# Patient Record
Sex: FEMALE | Race: WHITE
[De-identification: names, ages, dates, MRNs, and addresses within clinical notes are randomized per-mention and may not be internally consistent; named-entity substitution may affect disease eponyms.]

---

## 2018-02-14 ENCOUNTER — HOSPITAL ENCOUNTER (EMERGENCY)
Dept: HOSPITAL 17 - NEPK | Age: 23
Discharge: HOME | End: 2018-02-14
Payer: SELF-PAY

## 2018-02-14 VITALS
RESPIRATION RATE: 16 BRPM | HEART RATE: 87 BPM | SYSTOLIC BLOOD PRESSURE: 146 MMHG | TEMPERATURE: 98.2 F | DIASTOLIC BLOOD PRESSURE: 71 MMHG | OXYGEN SATURATION: 97 %

## 2018-02-14 VITALS — HEIGHT: 66 IN | WEIGHT: 220.46 LBS | BODY MASS INDEX: 35.43 KG/M2
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
## 2018-02-14 DIAGNOSIS — F41.9: ICD-10-CM

## 2018-02-14 DIAGNOSIS — F17.210: ICD-10-CM

## 2018-02-14 DIAGNOSIS — S29.012A: Primary | ICD-10-CM

## 2018-02-14 DIAGNOSIS — F32.9: ICD-10-CM

## 2018-02-14 LAB
BACTERIA #/AREA URNS HPF: (no result) /HPF
COLOR UR: YELLOW
GLUCOSE UR STRIP-MCNC: (no result) MG/DL
HGB UR QL STRIP: (no result)
KETONES UR STRIP-MCNC: (no result) MG/DL
NITRITE UR QL STRIP: (no result)
SP GR UR STRIP: 1.03 (ref 1–1.03)
SQUAMOUS #/AREA URNS HPF: 6 /HPF (ref 0–5)
URINE LEUKOCYTE ESTERASE: (no result)

## 2018-02-14 PROCEDURE — 81001 URINALYSIS AUTO W/SCOPE: CPT

## 2018-02-14 PROCEDURE — 99283 EMERGENCY DEPT VISIT LOW MDM: CPT

## 2018-02-14 PROCEDURE — 96372 THER/PROPH/DIAG INJ SC/IM: CPT

## 2018-02-14 PROCEDURE — 84703 CHORIONIC GONADOTROPIN ASSAY: CPT

## 2018-02-14 NOTE — PD
HPI


Chief Complaint:  Back/ Neck Pain or Injury


Time Seen by Provider:  13:15


Travel History


International Travel<30 days:  No


Contact w/Intl Traveler<30days:  No


Traveled to known affect area:  No





History of Present Illness


HPI


23-year-old female presents for evaluation of back pain.  She reports that 

prior to arrival she was carrying approximately 50 pounds worth of bags and she 

sat down and afterwards felt a popping sensation in her back.  She denies mid 

to lower back pain which is aching, worse with movement, somewhat improved when 

she is not moving.  She denies any abdominal pain, lower extremity numbness or 

tingling or weakness or radicular pain.  Denies any bowel or bladder 

incontinence bridge she reports that she has had similar pain in the past.  She 

has no other complaints at this time.





[facility]


Past Medical History


Anxiety:  Yes


Depression:  Yes


Psychiatric:  Yes (PRIOR  PSYCH VISITS )


Pregnant?:  Unknown


:  3


Para:  3





Past Surgical History


 Section:  No


Gynecologic Surgery:  Yes (gest diabities)


Other Surgery:  No (PER PATIENT)





Social History


Alcohol Use:  No


Tobacco Use:  Yes (5 CIG/DAY)


Substance Use:  No (Hx IV drug abuse)





Allergies-Medications


(Allergen,Severity, Reaction):  


Coded Allergies:  


     No Known Allergies (Unverified , 16)


Reported Meds & Prescriptions





Reported Meds & Active Scripts


Active


Ibuprofen 800 Mg Tab 800 Mg PO Q6HR PRN


Baclofen 10 Mg Tab 10 Mg PO Q8HR 10 Days








Review of Systems


Except as stated in HPI:  all other systems reviewed are Neg





Physical Exam


Narrative


GENERAL: Well-developed well-nourished female in no acute distress


SKIN: Warm and dry.


CARDIOVASCULAR: Regular rate and rhythm.  No murmur appreciated.


RESPIRATORY: No accessory muscle use. Clear to auscultation. Breath sounds 

equal bilaterally. 


GASTROINTESTINAL: Abdomen soft, non-tender, nondistended. Hepatic and splenic 

margins not palpable. 


MUSCULOSKELETAL: No obvious deformities.  There is no reproducible tenderness 

to palpation along the thoracic, lumbar midline or paravertebral musculature.  

The patient maintains 5 out of 5 muscle strength in the lower extremity muscle 

groups.


NEUROLOGICAL: Awake and alert. No obvious cranial nerve deficits.  Motor 

grossly within normal limits. Normal speech.





Data


Data


Last Documented VS





Vital Signs








  Date Time  Temp Pulse Resp B/P (MAP) Pulse Ox O2 Delivery O2 Flow Rate FiO2


 


18 11:36 98.2 87 16 146/71 (96) 97   








Orders





 Orders


Urinalysis - C+S If Indicated (18 11:57)


Ed Urine Pregnancytest Poc (18 11:57)


Ketorolac Inj (Toradol Inj) (18 13:15)





Labs





Laboratory Tests








Test


  18


12:00


 


Urine Color YELLOW 


 


Urine Turbidity CLEAR 


 


Urine pH 6.0 


 


Urine Specific Gravity 1.029 


 


Urine Protein NEG mg/dL 


 


Urine Glucose (UA) NEG mg/dL 


 


Urine Ketones NEG mg/dL 


 


Urine Occult Blood NEG 


 


Urine Nitrite NEG 


 


Urine Bilirubin NEG 


 


Urine Urobilinogen


  LESS THAN 2.0


MG/DL


 


Urine Leukocyte Esterase NEG 


 


Urine WBC 2 /hpf 


 


Urine Squamous Epithelial


Cells 6 /hpf 


 


 


Urine Bacteria RARE /hpf 


 


Microscopic Urinalysis Comment


  CULT NOT


INDICATED











MDM


Medical Decision Making


Medical Screen Exam Complete:  Yes


Emergency Medical Condition:  Yes


Medical Record Reviewed:  Yes


Differential Diagnosis


Thoracolumbar strain versus spasm versus scoliosis versus spinal stenosis 

versus herniated nucleus pulposus


Narrative Course


Patient's examination and history are consistent with thoracolumbar strain.  

The patient will be discharged with a short course of muscle relaxants and 

NSAIDs.  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 

constantly lift heavy objects and this is likely exacerbating her pain.  She 

may benefit from outpatient physical therapy.  Recommended follow-up with 

primary care physician.





Diagnosis





 Primary Impression:  


 Back strain


Referrals:  


[facility]


Departure Forms:  Tests/Procedures, Work Release   Enter return to work date:  

2018





***Additional Instructions:  


Medication as needed.  Take ibuprofen with meals.  Do not drive or drink 

alcohol when taking baclofen.  Avoid heavy lifting, strenuous activity.  Follow-

up with primary care physician in one to 2 weeks.  Return for any emergent 

medical conditions.


***Med/Other Pt SpecificInfo:  Prescription(s) given


Scripts


Ibuprofen (Ibuprofen) 800 Mg Tab


800 MG PO Q6HR Y for PAIN, #40 TAB 0 Refills


   Prov: Kelly Diehl MD         18 


Baclofen (Baclofen) 10 Mg Tab


10 MG PO Q8HR for 10 Days, TAB 0 Refills


   Prov: Kelly Diehl MD         18


Disposition:  01 DISCHARGE HOME


Condition:  Stable











Pascual Vizcarra 2018 13:18